# Patient Record
Sex: MALE | Race: WHITE | NOT HISPANIC OR LATINO | URBAN - METROPOLITAN AREA
[De-identification: names, ages, dates, MRNs, and addresses within clinical notes are randomized per-mention and may not be internally consistent; named-entity substitution may affect disease eponyms.]

---

## 2019-08-01 ENCOUNTER — EMERGENCY (EMERGENCY)
Facility: HOSPITAL | Age: 54
LOS: 0 days | Discharge: HOME | End: 2019-08-01
Attending: EMERGENCY MEDICINE | Admitting: EMERGENCY MEDICINE
Payer: MEDICAID

## 2019-08-01 VITALS
SYSTOLIC BLOOD PRESSURE: 170 MMHG | RESPIRATION RATE: 19 BRPM | DIASTOLIC BLOOD PRESSURE: 87 MMHG | OXYGEN SATURATION: 100 % | TEMPERATURE: 98 F | WEIGHT: 149.91 LBS | HEART RATE: 60 BPM

## 2019-08-01 DIAGNOSIS — R39.11 HESITANCY OF MICTURITION: ICD-10-CM

## 2019-08-01 DIAGNOSIS — R30.0 DYSURIA: ICD-10-CM

## 2019-08-01 LAB
ALBUMIN SERPL ELPH-MCNC: 3.9 G/DL — SIGNIFICANT CHANGE UP (ref 3.5–5.2)
ALP SERPL-CCNC: 60 U/L — SIGNIFICANT CHANGE UP (ref 30–115)
ALT FLD-CCNC: 18 U/L — SIGNIFICANT CHANGE UP (ref 0–41)
ANION GAP SERPL CALC-SCNC: 10 MMOL/L — SIGNIFICANT CHANGE UP (ref 7–14)
APPEARANCE UR: CLEAR — SIGNIFICANT CHANGE UP
AST SERPL-CCNC: 22 U/L — SIGNIFICANT CHANGE UP (ref 0–41)
BASOPHILS # BLD AUTO: 0.03 K/UL — SIGNIFICANT CHANGE UP (ref 0–0.2)
BASOPHILS NFR BLD AUTO: 0.7 % — SIGNIFICANT CHANGE UP (ref 0–1)
BILIRUB SERPL-MCNC: 0.6 MG/DL — SIGNIFICANT CHANGE UP (ref 0.2–1.2)
BILIRUB UR-MCNC: NEGATIVE — SIGNIFICANT CHANGE UP
BUN SERPL-MCNC: 23 MG/DL — HIGH (ref 10–20)
CALCIUM SERPL-MCNC: 9.3 MG/DL — SIGNIFICANT CHANGE UP (ref 8.5–10.1)
CHLORIDE SERPL-SCNC: 98 MMOL/L — SIGNIFICANT CHANGE UP (ref 98–110)
CO2 SERPL-SCNC: 28 MMOL/L — SIGNIFICANT CHANGE UP (ref 17–32)
COLOR SPEC: COLORLESS — SIGNIFICANT CHANGE UP
CREAT SERPL-MCNC: 1 MG/DL — SIGNIFICANT CHANGE UP (ref 0.7–1.5)
DIFF PNL FLD: SIGNIFICANT CHANGE UP
EOSINOPHIL # BLD AUTO: 0.11 K/UL — SIGNIFICANT CHANGE UP (ref 0–0.7)
EOSINOPHIL NFR BLD AUTO: 2.7 % — SIGNIFICANT CHANGE UP (ref 0–8)
GLUCOSE SERPL-MCNC: 98 MG/DL — SIGNIFICANT CHANGE UP (ref 70–99)
GLUCOSE UR QL: NEGATIVE — SIGNIFICANT CHANGE UP
HCT VFR BLD CALC: 39.5 % — LOW (ref 42–52)
HGB BLD-MCNC: 13 G/DL — LOW (ref 14–18)
IMM GRANULOCYTES NFR BLD AUTO: 0.2 % — SIGNIFICANT CHANGE UP (ref 0.1–0.3)
KETONES UR-MCNC: NEGATIVE — SIGNIFICANT CHANGE UP
LEUKOCYTE ESTERASE UR-ACNC: NEGATIVE — SIGNIFICANT CHANGE UP
LYMPHOCYTES # BLD AUTO: 1.68 K/UL — SIGNIFICANT CHANGE UP (ref 1.2–3.4)
LYMPHOCYTES # BLD AUTO: 41.3 % — SIGNIFICANT CHANGE UP (ref 20.5–51.1)
MCHC RBC-ENTMCNC: 28.4 PG — SIGNIFICANT CHANGE UP (ref 27–31)
MCHC RBC-ENTMCNC: 32.9 G/DL — SIGNIFICANT CHANGE UP (ref 32–37)
MCV RBC AUTO: 86.4 FL — SIGNIFICANT CHANGE UP (ref 80–94)
MONOCYTES # BLD AUTO: 0.44 K/UL — SIGNIFICANT CHANGE UP (ref 0.1–0.6)
MONOCYTES NFR BLD AUTO: 10.8 % — HIGH (ref 1.7–9.3)
NEUTROPHILS # BLD AUTO: 1.8 K/UL — SIGNIFICANT CHANGE UP (ref 1.4–6.5)
NEUTROPHILS NFR BLD AUTO: 44.3 % — SIGNIFICANT CHANGE UP (ref 42.2–75.2)
NITRITE UR-MCNC: NEGATIVE — SIGNIFICANT CHANGE UP
NRBC # BLD: 0 /100 WBCS — SIGNIFICANT CHANGE UP (ref 0–0)
PH UR: 7 — SIGNIFICANT CHANGE UP (ref 5–8)
PLATELET # BLD AUTO: 172 K/UL — SIGNIFICANT CHANGE UP (ref 130–400)
POTASSIUM SERPL-MCNC: 4.1 MMOL/L — SIGNIFICANT CHANGE UP (ref 3.5–5)
POTASSIUM SERPL-SCNC: 4.1 MMOL/L — SIGNIFICANT CHANGE UP (ref 3.5–5)
PROT SERPL-MCNC: 6.7 G/DL — SIGNIFICANT CHANGE UP (ref 6–8)
PROT UR-MCNC: NEGATIVE — SIGNIFICANT CHANGE UP
RBC # BLD: 4.57 M/UL — LOW (ref 4.7–6.1)
RBC # FLD: 12.7 % — SIGNIFICANT CHANGE UP (ref 11.5–14.5)
SODIUM SERPL-SCNC: 136 MMOL/L — SIGNIFICANT CHANGE UP (ref 135–146)
SP GR SPEC: 1.01 — LOW (ref 1.01–1.02)
UROBILINOGEN FLD QL: SIGNIFICANT CHANGE UP
WBC # BLD: 4.07 K/UL — LOW (ref 4.8–10.8)
WBC # FLD AUTO: 4.07 K/UL — LOW (ref 4.8–10.8)

## 2019-08-01 PROCEDURE — 99284 EMERGENCY DEPT VISIT MOD MDM: CPT

## 2019-08-01 NOTE — ED PROVIDER NOTE - NSFOLLOWUPCLINICS_GEN_ALL_ED_FT
[facility]  Medicine  [address], [place]   Phone: [phone]  Fax:     [facility]  Urology  .  [place]   Phone: [phone]  Fax:   Follow Up Time:

## 2019-08-01 NOTE — ED ADULT NURSE NOTE - CHIEF COMPLAINT QUOTE
Pt came c/o lower back pain and frequency with burning on urination x 1 year, pt just came from [facility] 2 weeks ago.

## 2019-08-01 NOTE — ED PROVIDER NOTE - CLINICAL SUMMARY MEDICAL DECISION MAKING FREE TEXT BOX
intermitt urinary hesitancy & bl lbp - neuro exam nl - ED w/u incl labs, urine wnl - all results d/w pt & copies given, strict return precautions discussed, rec outpt PCP/Uro f/u, referrals given

## 2019-08-01 NOTE — ED ADULT TRIAGE NOTE - CHIEF COMPLAINT QUOTE
Pt came c/o lower back pain and frequency with burning on urination x 1 year, pt just came from [facility] 2 weeks ago.

## 2019-08-01 NOTE — ED PROVIDER NOTE - NS ED ROS FT
Constitutional: No fever, chills, unintended weight loss.  Eyes:  No visual changes, eye pain or discharge.  ENMT:  No hearing changes, pain, no sore throat or runny nose, no difficulty swallowing  Cardiac:  No chest pain, SOB or edema. No chest pain with exertion.  Respiratory:  No cough or respiratory distress. No hemoptysis. No history of asthma or RAD.  GI:  No nausea, vomiting, diarrhea or abdominal pain.  :  see hpi  MS:  No myalgia, muscle weakness, joint pain; intermitt bl lbp  Neuro:  No headache or weakness.  No LOC.  Skin:  No skin rash.   Endocrine: No history of thyroid disease or diabetes.

## 2019-08-01 NOTE — ED ADULT NURSE NOTE - OBJECTIVE STATEMENT
Pt reports bilateral flank pain and frequent urination. Pt denies nausea, vomiting, diarrhea, and fever.

## 2019-08-01 NOTE — ED PROVIDER NOTE - PHYSICAL EXAMINATION
VITAL SIGNS: I have reviewed nursing notes and confirm.  CONSTITUTIONAL: Well-developed; well-nourished; in no acute distress.  SKIN: Skin exam is warm and dry, no acute rash.  HEAD: Normocephalic; atraumatic.  EYES: PERRL, EOM intact; conjunctiva and sclera clear.  ENT: No nasal discharge; airway clear.  NECK: Supple; non tender.  CARD: S1, S2 normal; no murmurs, gallops, or rubs. Regular rate and rhythm.  RESP: No wheezes, rales or rhonchi.  ABD: Normal bowel sounds; soft; non-distended; non-tender; no hepatosplenomegaly.  BACK: non-tender, no CVAT  EXT: Normal ROM. No clubbing, cyanosis or edema. DPI.  NEURO: Alert, oriented. Grossly unremarkable. No focal deficits. 5/5 strength x 4, gross sensation intact, DTRs intact.  PSYCH: Cooperative, appropriate.

## 2019-08-01 NOTE — ED PROVIDER NOTE - NSFOLLOWUPINSTRUCTIONS_ED_ALL_ED_FT
Dysuria    Dysuria is pain or discomfort while urinating. The pain or discomfort may be felt in the tube that carries urine out of the bladder (urethra) or in the surrounding tissue of the genitals. The pain may also be felt in the groin area, lower abdomen, and lower back. You may have to urinate frequently or have the sudden feeling that you have to urinate (urgency). Dysuria can affect both men and women, but is more common in women.    Dysuria can be caused by many different things, including:    Urinary tract infection in women.  Infection of the kidney or bladder.  Kidney stones or bladder stones.  Certain sexually transmitted infections (STIs), such as chlamydia.  Dehydration.  Inflammation of the vagina.  Use of certain medicines.  Use of certain soaps or scented products that cause irritation.    HOME CARE INSTRUCTIONS  Watch your dysuria for any changes. The following actions may help to reduce any discomfort you are feeling:    Drink enough fluid to keep your urine clear or pale yellow.  Empty your bladder often. Avoid holding urine for long periods of time.  After a bowel movement or urination, women should cleanse from front to back, using each tissue only once.  Empty your bladder after sexual intercourse.  Take medicines only as directed by your health care provider.  If you were prescribed an antibiotic medicine, finish it all even if you start to feel better.  Avoid caffeine, tea, and alcohol. They can irritate the bladder and make dysuria worse. In men, alcohol may irritate the prostate.  Keep all follow-up visits as directed by your health care provider. This is important.  If you had any tests done to find the cause of dysuria, it is your responsibility to obtain your test results. Ask the lab or department performing the test when and how you will get your results. Talk with your health care provider if you have any questions about your results.    SEEK MEDICAL CARE IF:  You develop pain in your back or sides.  You have a fever.  You have nausea or vomiting.  You have blood in your urine.  You are not urinating as often as you usually do.    SEEK IMMEDIATE MEDICAL CARE IF:  You pain is severe and not relieved with medicines.  You are unable to hold down any fluids.  You or someone else notices a change in your mental function.  You have a rapid heartbeat at rest.  You have shaking or chills.  You feel extremely weak.    ADDITIONAL NOTES AND INSTRUCTIONS    Please follow up with your Primary MD in 24-48 hr.  Seek immediate medical care for any new/worsening signs or symptoms.

## 2019-08-01 NOTE — ED PROVIDER NOTE - OBJECTIVE STATEMENT
53y m no pmh p/w intermitt urinary hesitency & bl lbp x 1 yr. Recently emig[place] from [facility] 3 wks ago for work. DId not seek medical care in [facility]. Denies f/c, cp/sob, nvd, abd pain, saddle anesthesia, focal numbness or weakness, dysuria, hematuria.

## 2019-08-02 LAB
CULTURE RESULTS: NO GROWTH — SIGNIFICANT CHANGE UP
SPECIMEN SOURCE: SIGNIFICANT CHANGE UP

## 2019-09-13 ENCOUNTER — EMERGENCY (EMERGENCY)
Facility: HOSPITAL | Age: 54
LOS: 0 days | Discharge: HOME | End: 2019-09-13
Attending: EMERGENCY MEDICINE | Admitting: EMERGENCY MEDICINE
Payer: MEDICAID

## 2019-09-13 VITALS
OXYGEN SATURATION: 99 % | SYSTOLIC BLOOD PRESSURE: 209 MMHG | RESPIRATION RATE: 18 BRPM | HEART RATE: 75 BPM | DIASTOLIC BLOOD PRESSURE: 103 MMHG | TEMPERATURE: 98 F

## 2019-09-13 VITALS
SYSTOLIC BLOOD PRESSURE: 178 MMHG | RESPIRATION RATE: 17 BRPM | DIASTOLIC BLOOD PRESSURE: 109 MMHG | OXYGEN SATURATION: 100 % | HEART RATE: 76 BPM

## 2019-09-13 DIAGNOSIS — R10.9 UNSPECIFIED ABDOMINAL PAIN: ICD-10-CM

## 2019-09-13 DIAGNOSIS — I16.0 HYPERTENSIVE URGENCY: ICD-10-CM

## 2019-09-13 DIAGNOSIS — G89.29 OTHER CHRONIC PAIN: ICD-10-CM

## 2019-09-13 DIAGNOSIS — M54.6 PAIN IN THORACIC SPINE: ICD-10-CM

## 2019-09-13 DIAGNOSIS — R35.0 FREQUENCY OF MICTURITION: ICD-10-CM

## 2019-09-13 DIAGNOSIS — R30.0 DYSURIA: ICD-10-CM

## 2019-09-13 DIAGNOSIS — R51 HEADACHE: ICD-10-CM

## 2019-09-13 PROBLEM — I10 ESSENTIAL (PRIMARY) HYPERTENSION: Chronic | Status: ACTIVE | Noted: 2019-08-01

## 2019-09-13 PROCEDURE — 99283 EMERGENCY DEPT VISIT LOW MDM: CPT

## 2019-09-13 RX ORDER — LISINOPRIL 2.5 MG/1
10 TABLET ORAL DAILY
Refills: 0 | Status: DISCONTINUED | OUTPATIENT
Start: 2019-09-13 | End: 2019-09-13

## 2019-09-13 RX ORDER — AMLODIPINE BESYLATE 2.5 MG/1
5 TABLET ORAL ONCE
Refills: 0 | Status: COMPLETED | OUTPATIENT
Start: 2019-09-13 | End: 2019-09-13

## 2019-09-13 RX ORDER — AMLODIPINE BESYLATE 2.5 MG/1
1 TABLET ORAL
Qty: 30 | Refills: 0
Start: 2019-09-13 | End: 2019-10-12

## 2019-09-13 RX ORDER — LISINOPRIL 2.5 MG/1
1 TABLET ORAL
Qty: 30 | Refills: 0
Start: 2019-09-13 | End: 2019-10-12

## 2019-09-13 RX ADMIN — LISINOPRIL 10 MILLIGRAM(S): 2.5 TABLET ORAL at 09:46

## 2019-09-13 RX ADMIN — AMLODIPINE BESYLATE 5 MILLIGRAM(S): 2.5 TABLET ORAL at 09:46

## 2019-09-13 NOTE — ED PROVIDER NOTE - NS ED ROS FT
CONSTITUTIONAL: no fever, no chills  EYES: no eye pain, no visual changes  ENT: no ear pain, no hearing changes  CARD: no chest pain, no palpitations  RESP: no cough, no SOB  GI: no nausea, no vomiting, no diarrhea. +chronic abd pain  : +dysuria, +frequency  MSK: +chronic upper back pain, no neck pain  SKIN: no skin rashes, no pruritus  NEURO: +mild headache, no dizziness, no weakness or numbness

## 2019-09-13 NOTE — ED ADULT NURSE NOTE - OBJECTIVE STATEMENT
pt came to the ER today with c/o high blood pressure and urinary frequency. pt states the medications from his country are not working and he ran out of prescription he was given in the [place]. denies headache denies chest pain.

## 2019-09-13 NOTE — ED PROVIDER NOTE - PROGRESS NOTE DETAILS
Patient does not have flank pain, but wants refill of his hypertensive medication. No w/u for given symptoms. Will d/c with f/u to medicine clinic at [address] [place] [address]. Rapid clinic f/u appointment on 9/17/19 at 12:45 PM. Will d/c with lisinopril and amlodipine for 1 month. Patient agrees with plan.

## 2019-09-13 NOTE — ED PROVIDER NOTE - PHYSICAL EXAMINATION
GENERAL: well-appearing male, in NAD lying on stretcher  HEAD: NCAT  EYES: no scleral icterus, no conjunctival injection  ENT: moist mucous membranes, nl appearing pharynx  NECK: supple, no masses  CARD: nl s1 s2, RRR, no m/r/g  RESP: nl respiratory effort, CTAB  ABD: soft, non-tender, non-distended  BACK: no CVA tenderness b/l  EXT: FROMx4, no pedal edema, distal pulses intact  SKIN: warm, dry  NEURO: A&Ox3, answers questions appropriately, CN II-XII intact, sensation intact to gross/light touch, 5/5 strength in b/l UE and LE

## 2019-09-13 NOTE — ED PROVIDER NOTE - CLINICAL SUMMARY MEDICAL DECISION MAKING FREE TEXT BOX
Feels better after meds.  Given rapid clinic follow up as well as short course of medication refills.

## 2019-09-13 NOTE — ED ADULT NURSE NOTE - NSIMPLEMENTINTERV_GEN_ALL_ED
Implemented All Universal Safety Interventions:  [place] to call system. Call bell, personal items and telephone within reach. Instruct patient to call for assistance. Room bathroom lighting operational. Non-slip footwear when patient is off stretcher. Physically safe environment: no spills, clutter or unnecessary equipment. Stretcher in lowest position, wheels locked, appropriate side rails in place.

## 2019-09-13 NOTE — ED PROVIDER NOTE - ATTENDING CONTRIBUTION TO CARE
Attending Statement: I have personally seen and examined this patient. I have fully participated in the care of this patient. I have reviewed all pertinent clinical information, including history, physical exam, plan and the Medical Student's note and agree except as noted.    56 y/o male with hx of HTN, non compliant with meds as has not refilled them, presents to ED with HA and request med refill.  No CP/SOB.  No numbness, tingling or weakness.  No dysarthria or dysphagia. PE:  agree with above.  A/P:  Uncontrolled HTN.  Meds and reassess.

## 2019-09-13 NOTE — ED PROVIDER NOTE - PATIENT PORTAL LINK FT
You can access the FollowMyHealth Patient Portal offered by [facility] by registering at the following website: [email or web address]. By joining [facility]’s FollowMyHealth portal, you will also be able to view your health information using other applications (apps) compatible with our system.

## 2019-09-13 NOTE — ED PROVIDER NOTE - NSFOLLOWUPCLINICS_GEN_ALL_ED_FT
[facility]  Medicine  [address], [place]   Phone: [phone]  Fax:   Follow Up Time:

## 2019-09-17 ENCOUNTER — OUTPATIENT (OUTPATIENT)
Dept: OUTPATIENT SERVICES | Facility: HOSPITAL | Age: 54
LOS: 1 days | Discharge: HOME | End: 2019-09-17

## 2019-09-17 ENCOUNTER — APPOINTMENT (OUTPATIENT)
Dept: INTERNAL MEDICINE | Facility: CLINIC | Age: 54
End: 2019-09-17

## 2019-09-17 VITALS
HEIGHT: 70 IN | DIASTOLIC BLOOD PRESSURE: 104 MMHG | BODY MASS INDEX: 25.77 KG/M2 | HEART RATE: 79 BPM | TEMPERATURE: 99 F | SYSTOLIC BLOOD PRESSURE: 156 MMHG | WEIGHT: 180 LBS

## 2019-09-17 DIAGNOSIS — Z87.891 PERSONAL HISTORY OF NICOTINE DEPENDENCE: ICD-10-CM

## 2019-09-17 DIAGNOSIS — R30.0 DYSURIA: ICD-10-CM

## 2019-09-17 DIAGNOSIS — Z87.442 PERSONAL HISTORY OF URINARY CALCULI: ICD-10-CM

## 2019-09-17 DIAGNOSIS — Z80.8 FAMILY HISTORY OF MALIGNANT NEOPLASM OF OTHER ORGANS OR SYSTEMS: ICD-10-CM

## 2019-09-17 DIAGNOSIS — Z82.49 FAMILY HISTORY OF ISCHEMIC HEART DISEASE AND OTHER DISEASES OF THE CIRCULATORY SYSTEM: ICD-10-CM

## 2019-09-17 DIAGNOSIS — N40.0 BENIGN PROSTATIC HYPERPLASIA WITHOUT LOWER URINARY TRACT SYMPMS: ICD-10-CM

## 2019-09-17 DIAGNOSIS — I10 ESSENTIAL (PRIMARY) HYPERTENSION: ICD-10-CM

## 2019-09-17 DIAGNOSIS — K52.9 NONINFECTIVE GASTROENTERITIS AND COLITIS, UNSPECIFIED: ICD-10-CM

## 2019-09-17 DIAGNOSIS — G89.29 DORSALGIA, UNSPECIFIED: ICD-10-CM

## 2019-09-17 DIAGNOSIS — Z00.00 ENCOUNTER FOR GENERAL ADULT MEDICAL EXAMINATION W/OUT ABNORMAL FINDINGS: ICD-10-CM

## 2019-09-17 DIAGNOSIS — Z87.440 PERSONAL HISTORY OF URINARY (TRACT) INFECTIONS: ICD-10-CM

## 2019-09-17 DIAGNOSIS — M54.9 DORSALGIA, UNSPECIFIED: ICD-10-CM

## 2019-09-17 DIAGNOSIS — R39.12 POOR URINARY STREAM: ICD-10-CM

## 2019-09-17 RX ORDER — TAMSULOSIN HYDROCHLORIDE 0.4 MG/1
0.4 CAPSULE ORAL
Qty: 30 | Refills: 3 | Status: ACTIVE | COMMUNITY
Start: 2019-09-17 | End: 1900-01-01

## 2019-09-17 RX ORDER — LISINOPRIL 10 MG/1
10 TABLET ORAL DAILY
Qty: 30 | Refills: 3 | Status: ACTIVE | COMMUNITY
Start: 2019-09-17 | End: 1900-01-01

## 2019-09-17 RX ORDER — AMLODIPINE BESYLATE 5 MG/1
5 TABLET ORAL DAILY
Qty: 30 | Refills: 5 | Status: ACTIVE | COMMUNITY
Start: 2019-09-17 | End: 1900-01-01

## 2019-09-17 NOTE — PHYSICAL EXAM
[No Acute Distress] : no acute distress [Well Developed] : well developed [Well Nourished] : well nourished [PERRL] : pupils equal round and reactive to light [Well-Appearing] : well-appearing [Normal Sclera/Conjunctiva] : normal sclera/conjunctiva [Normal Outer Ear/Nose] : the outer ears and nose were normal in appearance [EOMI] : extraocular movements intact [Normal Oropharynx] : the oropharynx was normal [No Lymphadenopathy] : no lymphadenopathy [No JVD] : no jugular venous distention [Supple] : supple [Thyroid Normal, No Nodules] : the thyroid was normal and there were no nodules present [No Respiratory Distress] : no respiratory distress  [No Accessory Muscle Use] : no accessory muscle use [Normal Rate] : normal rate  [Clear to Auscultation] : lungs were clear to auscultation bilaterally [Normal S1, S2] : normal S1 and S2 [Regular Rhythm] : with a regular rhythm [No Murmur] : no murmur heard [No Carotid Bruits] : no carotid bruits [No Varicosities] : no varicosities [No Abdominal Bruit] : a ~M bruit was not heard ~T in the abdomen [Pedal Pulses Present] : the pedal pulses are present [No Palpable Aorta] : no palpable aorta [No Edema] : there was no peripheral edema [Soft] : abdomen soft [No Extremity Clubbing/Cyanosis] : no extremity clubbing/cyanosis [Non Tender] : non-tender [Non-distended] : non-distended [No Masses] : no abdominal mass palpated [No HSM] : no HSM [Normal Bowel Sounds] : normal bowel sounds [Normal Posterior Cervical Nodes] : no posterior cervical lymphadenopathy [Normal Anterior Cervical Nodes] : no anterior cervical lymphadenopathy [No CVA Tenderness] : no CVA  tenderness [No Spinal Tenderness] : no spinal tenderness [Grossly Normal Strength/Tone] : grossly normal strength/tone [No Joint Swelling] : no joint swelling [No Rash] : no rash [Coordination Grossly Intact] : coordination grossly intact [No Focal Deficits] : no focal deficits [Normal Gait] : normal gait [Deep Tendon Reflexes (DTR)] : deep tendon reflexes were 2+ and symmetric [Normal Affect] : the affect was normal [Normal Insight/Judgement] : insight and judgment were intact

## 2019-09-19 ENCOUNTER — FORM ENCOUNTER (OUTPATIENT)
Age: 54
End: 2019-09-19

## 2019-09-20 ENCOUNTER — OUTPATIENT (OUTPATIENT)
Dept: OUTPATIENT SERVICES | Facility: HOSPITAL | Age: 54
LOS: 1 days | Discharge: HOME | End: 2019-09-20
Payer: MEDICAID

## 2019-09-20 DIAGNOSIS — R30.0 DYSURIA: ICD-10-CM

## 2019-09-20 LAB
ALBUMIN SERPL ELPH-MCNC: 4.8 G/DL
ALP BLD-CCNC: 63 U/L
ALT SERPL-CCNC: 17 U/L
ANION GAP SERPL CALC-SCNC: 12 MMOL/L
APPEARANCE: CLEAR
AST SERPL-CCNC: 18 U/L
BASOPHILS # BLD AUTO: 0.02 K/UL
BASOPHILS NFR BLD AUTO: 0.5 %
BILIRUB SERPL-MCNC: 0.5 MG/DL
BILIRUBIN URINE: NEGATIVE
BLOOD URINE: NEGATIVE
BUN SERPL-MCNC: 13 MG/DL
CALCIUM SERPL-MCNC: 9.8 MG/DL
CHLORIDE SERPL-SCNC: 100 MMOL/L
CHOLEST SERPL-MCNC: 208 MG/DL
CHOLEST/HDLC SERPL: 4.6 RATIO
CO2 SERPL-SCNC: 32 MMOL/L
COLOR: YELLOW
CREAT SERPL-MCNC: 1 MG/DL
EOSINOPHIL # BLD AUTO: 0.06 K/UL
EOSINOPHIL NFR BLD AUTO: 1.5 %
ESTIMATED AVERAGE GLUCOSE: 114 MG/DL
GLUCOSE QUALITATIVE U: NEGATIVE
GLUCOSE SERPL-MCNC: 92 MG/DL
HBA1C MFR BLD HPLC: 5.6 %
HCT VFR BLD CALC: 43.8 %
HDLC SERPL-MCNC: 45 MG/DL
HGB BLD-MCNC: 14.6 G/DL
IMM GRANULOCYTES NFR BLD AUTO: 0 %
KETONES URINE: NEGATIVE
LDLC SERPL CALC-MCNC: 142 MG/DL
LEUKOCYTE ESTERASE URINE: NEGATIVE
LYMPHOCYTES # BLD AUTO: 1.97 K/UL
LYMPHOCYTES NFR BLD AUTO: 50.5 %
MAN DIFF?: NORMAL
MCHC RBC-ENTMCNC: 28.5 PG
MCHC RBC-ENTMCNC: 33.3 G/DL
MCV RBC AUTO: 85.4 FL
MONOCYTES # BLD AUTO: 0.27 K/UL
MONOCYTES NFR BLD AUTO: 6.9 %
NEUTROPHILS # BLD AUTO: 1.58 K/UL
NEUTROPHILS NFR BLD AUTO: 40.6 %
NITRITE URINE: NEGATIVE
PH URINE: 6.5
PLATELET # BLD AUTO: 239 K/UL
POTASSIUM SERPL-SCNC: 5 MMOL/L
PROT SERPL-MCNC: 7.6 G/DL
PROTEIN URINE: NORMAL
PSA FREE FLD-MCNC: 13 %
PSA FREE SERPL-MCNC: 0.15 NG/ML
PSA SERPL-MCNC: 1.22 NG/ML
RBC # BLD: 5.13 M/UL
RBC # FLD: 12.8 %
SODIUM SERPL-SCNC: 144 MMOL/L
SPECIFIC GRAVITY URINE: 1.02
TRIGL SERPL-MCNC: 151 MG/DL
TSH SERPL-ACNC: 2.17 UIU/ML
UROBILINOGEN URINE: NORMAL
WBC # FLD AUTO: 3.9 K/UL

## 2019-09-20 PROCEDURE — 76856 US EXAM PELVIC COMPLETE: CPT | Mod: 26

## 2019-10-24 ENCOUNTER — EMERGENCY (EMERGENCY)
Facility: HOSPITAL | Age: 54
LOS: 0 days | Discharge: LEFT BEFORE DISCHARGE | End: 2019-10-24
Attending: EMERGENCY MEDICINE | Admitting: EMERGENCY MEDICINE
Payer: MEDICAID

## 2019-10-24 VITALS
RESPIRATION RATE: 18 BRPM | DIASTOLIC BLOOD PRESSURE: 95 MMHG | OXYGEN SATURATION: 100 % | SYSTOLIC BLOOD PRESSURE: 141 MMHG | HEART RATE: 73 BPM | TEMPERATURE: 98 F

## 2019-10-24 VITALS
OXYGEN SATURATION: 98 % | TEMPERATURE: 97 F | HEIGHT: 69 IN | WEIGHT: 160.06 LBS | HEART RATE: 76 BPM | RESPIRATION RATE: 19 BRPM | SYSTOLIC BLOOD PRESSURE: 163 MMHG | DIASTOLIC BLOOD PRESSURE: 88 MMHG

## 2019-10-24 DIAGNOSIS — R07.9 CHEST PAIN, UNSPECIFIED: ICD-10-CM

## 2019-10-24 DIAGNOSIS — Z98.890 OTHER SPECIFIED POSTPROCEDURAL STATES: Chronic | ICD-10-CM

## 2019-10-24 DIAGNOSIS — R07.89 OTHER CHEST PAIN: ICD-10-CM

## 2019-10-24 DIAGNOSIS — R06.02 SHORTNESS OF BREATH: ICD-10-CM

## 2019-10-24 DIAGNOSIS — Z87.891 PERSONAL HISTORY OF NICOTINE DEPENDENCE: ICD-10-CM

## 2019-10-24 DIAGNOSIS — I10 ESSENTIAL (PRIMARY) HYPERTENSION: ICD-10-CM

## 2019-10-24 DIAGNOSIS — R00.2 PALPITATIONS: ICD-10-CM

## 2019-10-24 LAB
ALBUMIN SERPL ELPH-MCNC: 4.3 G/DL — SIGNIFICANT CHANGE UP (ref 3.5–5.2)
ALP SERPL-CCNC: 51 U/L — SIGNIFICANT CHANGE UP (ref 30–115)
ALT FLD-CCNC: 14 U/L — SIGNIFICANT CHANGE UP (ref 0–41)
ANION GAP SERPL CALC-SCNC: 14 MMOL/L — SIGNIFICANT CHANGE UP (ref 7–14)
APPEARANCE UR: CLEAR — SIGNIFICANT CHANGE UP
APTT BLD: 31.5 SEC — SIGNIFICANT CHANGE UP (ref 27–39.2)
AST SERPL-CCNC: 18 U/L — SIGNIFICANT CHANGE UP (ref 0–41)
BASOPHILS # BLD AUTO: 0.02 K/UL — SIGNIFICANT CHANGE UP (ref 0–0.2)
BASOPHILS NFR BLD AUTO: 0.7 % — SIGNIFICANT CHANGE UP (ref 0–1)
BILIRUB SERPL-MCNC: 0.7 MG/DL — SIGNIFICANT CHANGE UP (ref 0.2–1.2)
BILIRUB UR-MCNC: NEGATIVE — SIGNIFICANT CHANGE UP
BUN SERPL-MCNC: 13 MG/DL — SIGNIFICANT CHANGE UP (ref 10–20)
CALCIUM SERPL-MCNC: 9.4 MG/DL — SIGNIFICANT CHANGE UP (ref 8.5–10.1)
CHLORIDE SERPL-SCNC: 99 MMOL/L — SIGNIFICANT CHANGE UP (ref 98–110)
CO2 SERPL-SCNC: 25 MMOL/L — SIGNIFICANT CHANGE UP (ref 17–32)
COLOR SPEC: YELLOW — SIGNIFICANT CHANGE UP
CREAT SERPL-MCNC: 0.9 MG/DL — SIGNIFICANT CHANGE UP (ref 0.7–1.5)
D DIMER BLD IA.RAPID-MCNC: 108 NG/ML DDU — SIGNIFICANT CHANGE UP (ref 0–230)
DIFF PNL FLD: NEGATIVE — SIGNIFICANT CHANGE UP
EOSINOPHIL # BLD AUTO: 0.09 K/UL — SIGNIFICANT CHANGE UP (ref 0–0.7)
EOSINOPHIL NFR BLD AUTO: 3.2 % — SIGNIFICANT CHANGE UP (ref 0–8)
GLUCOSE SERPL-MCNC: 96 MG/DL — SIGNIFICANT CHANGE UP (ref 70–99)
GLUCOSE UR QL: NEGATIVE — SIGNIFICANT CHANGE UP
HCT VFR BLD CALC: 42.4 % — SIGNIFICANT CHANGE UP (ref 42–52)
HGB BLD-MCNC: 14.2 G/DL — SIGNIFICANT CHANGE UP (ref 14–18)
IMM GRANULOCYTES NFR BLD AUTO: 0 % — LOW (ref 0.1–0.3)
INR BLD: 1.09 RATIO — SIGNIFICANT CHANGE UP (ref 0.65–1.3)
KETONES UR-MCNC: NEGATIVE — SIGNIFICANT CHANGE UP
LEUKOCYTE ESTERASE UR-ACNC: NEGATIVE — SIGNIFICANT CHANGE UP
LYMPHOCYTES # BLD AUTO: 1.39 K/UL — SIGNIFICANT CHANGE UP (ref 1.2–3.4)
LYMPHOCYTES # BLD AUTO: 49.6 % — SIGNIFICANT CHANGE UP (ref 20.5–51.1)
MCHC RBC-ENTMCNC: 28.7 PG — SIGNIFICANT CHANGE UP (ref 27–31)
MCHC RBC-ENTMCNC: 33.5 G/DL — SIGNIFICANT CHANGE UP (ref 32–37)
MCV RBC AUTO: 85.8 FL — SIGNIFICANT CHANGE UP (ref 80–94)
MONOCYTES # BLD AUTO: 0.22 K/UL — SIGNIFICANT CHANGE UP (ref 0.1–0.6)
MONOCYTES NFR BLD AUTO: 7.9 % — SIGNIFICANT CHANGE UP (ref 1.7–9.3)
NEUTROPHILS # BLD AUTO: 1.08 K/UL — LOW (ref 1.4–6.5)
NEUTROPHILS NFR BLD AUTO: 38.6 % — LOW (ref 42.2–75.2)
NITRITE UR-MCNC: NEGATIVE — SIGNIFICANT CHANGE UP
NRBC # BLD: 0 /100 WBCS — SIGNIFICANT CHANGE UP (ref 0–0)
NT-PROBNP SERPL-SCNC: 96 PG/ML — SIGNIFICANT CHANGE UP (ref 0–300)
PH UR: 7 — SIGNIFICANT CHANGE UP (ref 5–8)
PLATELET # BLD AUTO: 216 K/UL — SIGNIFICANT CHANGE UP (ref 130–400)
POTASSIUM SERPL-MCNC: 4.5 MMOL/L — SIGNIFICANT CHANGE UP (ref 3.5–5)
POTASSIUM SERPL-SCNC: 4.5 MMOL/L — SIGNIFICANT CHANGE UP (ref 3.5–5)
PROT SERPL-MCNC: 7.2 G/DL — SIGNIFICANT CHANGE UP (ref 6–8)
PROT UR-MCNC: SIGNIFICANT CHANGE UP
PROTHROM AB SERPL-ACNC: 12.5 SEC — SIGNIFICANT CHANGE UP (ref 9.95–12.87)
RBC # BLD: 4.94 M/UL — SIGNIFICANT CHANGE UP (ref 4.7–6.1)
RBC # FLD: 12.6 % — SIGNIFICANT CHANGE UP (ref 11.5–14.5)
SODIUM SERPL-SCNC: 138 MMOL/L — SIGNIFICANT CHANGE UP (ref 135–146)
SP GR SPEC: 1.02 — SIGNIFICANT CHANGE UP (ref 1.01–1.02)
TROPONIN T SERPL-MCNC: <0.01 NG/ML — SIGNIFICANT CHANGE UP
TROPONIN T SERPL-MCNC: <0.01 NG/ML — SIGNIFICANT CHANGE UP
UROBILINOGEN FLD QL: SIGNIFICANT CHANGE UP
WBC # BLD: 2.8 K/UL — LOW (ref 4.8–10.8)
WBC # FLD AUTO: 2.8 K/UL — LOW (ref 4.8–10.8)

## 2019-10-24 PROCEDURE — 75571 CT HRT W/O DYE W/CA TEST: CPT | Mod: 26

## 2019-10-24 PROCEDURE — 93016 CV STRESS TEST SUPVJ ONLY: CPT

## 2019-10-24 PROCEDURE — 99234 HOSP IP/OBS SM DT SF/LOW 45: CPT

## 2019-10-24 PROCEDURE — 71046 X-RAY EXAM CHEST 2 VIEWS: CPT | Mod: 26

## 2019-10-24 PROCEDURE — 93010 ELECTROCARDIOGRAM REPORT: CPT

## 2019-10-24 PROCEDURE — 99284 EMERGENCY DEPT VISIT MOD MDM: CPT

## 2019-10-24 PROCEDURE — 93018 CV STRESS TEST I&R ONLY: CPT

## 2019-10-24 RX ORDER — METOPROLOL TARTRATE 50 MG
50 TABLET ORAL ONCE
Refills: 0 | Status: COMPLETED | OUTPATIENT
Start: 2019-10-24 | End: 2019-10-24

## 2019-10-24 RX ORDER — ASPIRIN/CALCIUM CARB/MAGNESIUM 324 MG
325 TABLET ORAL ONCE
Refills: 0 | Status: COMPLETED | OUTPATIENT
Start: 2019-10-24 | End: 2019-10-24

## 2019-10-24 RX ADMIN — Medication 50 MILLIGRAM(S): at 11:41

## 2019-10-24 RX ADMIN — Medication 325 MILLIGRAM(S): at 11:41

## 2019-10-24 NOTE — ED ADULT NURSE REASSESSMENT NOTE - NS ED NURSE REASSESS COMMENT FT1
Received pt from previous RN A/O times 4 Vs stable placed on cardiac monitor denies chest pain no SOB no N/V no dizziness ambulate steady ,comfort provide lab work  done completed ,pt is seen  evaluate by ED attending  ready to be transport to CT with transporter ,ongoing nursing observation.

## 2019-10-24 NOTE — ED PROVIDER NOTE - PROGRESS NOTE DETAILS
53 yo M with hx of HTN, former smoker presenting with atypical pleuritic chest pain since last night. PERC + for age. Ordered labs, d-dimer, ekg, cxr. Will reassess. TC: 55 yo M with hx of HTN, former smoker presenting with atypical pleuritic chest pain since last night. PERC + for age. Ordered labs, d-dimer, ekg, cxr, ua, urine cx. Will reassess. TC: Labs wnl including negative d-dimer and trop. Ekg without ischemic changes. Cxr negative. Given  mg. Will place in obs for chest pain workup. TC: Labs wnl including negative d-dimer and trop. Ekg without ischemic changes. Cxr negative. Given  mg. HEART score 2 (+risk factors, +age). Will place in obs for chest pain workup. Ua, urine cx sent - will f/u in obs.

## 2019-10-24 NOTE — ED PROVIDER NOTE - ATTENDING CONTRIBUTION TO CARE
53 yo M ex smoker, htn, now with midsternal cp, cramping. no assoc n/v/diaph. non exertional. no cough/fever. no prev w/u. pmd - clinic  vss, nontoxic, well appearing, pink conj, anicteric, MMM, neck supple, CTAB, RRR, equal radial pulses bilat, abd soft/nt/nd, no cva tend. no calves tend, no edema, no fnd. no rashes.  a/p: labs, imaging, reassess

## 2019-10-24 NOTE — ED CDU PROVIDER INITIAL DAY NOTE - PROGRESS NOTE DETAILS
Spoke with Dr Verdugo, there is dense calcium in the LAD, RCA and circumflex, concerned that study may be nondiagnostic; given multiple vessel involvement, consulted cardiology, Dr Campoverde to see pt.

## 2019-10-24 NOTE — CONSULT NOTE ADULT - SUBJECTIVE AND OBJECTIVE BOX
Patient is a 54y old  Male who presents with a chief complaint of   HPI:  Pt is a 54Y M with a PMh of HTN and diarrhea, presenting to the ED for x1day of chest pain. Pt reports that CP started lat night around 9 pm while sitting down watching TV. Pt reports pain started suddenly without reason. Pt describes intermittent  pressure like 10/10 chest pain, radiating to L shoulder, aggravated by laying down, alleviated for standing up. Pt reports no previous such episodes. Pt also reports being SOB during episodes, no previous hx of sob according to pt. pt reports coughing due to sickness with a cold.     Pt is of men descent, working in a [employer], travels to [facility] for 4-6months per year.     EKG shows normal sinus rhythm  Troponin <.01  BNP 96  CT Heart Calcium score 282 putting him at 99th percentile for his age      ROS:  HEENT: denies vision changes, HA  CV/R: per HPI  ABD: Hx of diarrhea, longstanding and using immodium.   : difficulty urinating, following out-pt  MSK/Skin: no edema of extremities    PAST MEDICAL & SURGICAL HISTORY  HTN (hypertension)  H/O hernia repair      FAMILY HISTORY:  FAMILY HISTORY:      SOCIAL HISTORY:  [+]smoker: 2PPD [id number], quit 4mo ago  [-]Alcohol  [-]Drug    ALLERGIES:  No Known Allergies      MEDICATIONS:  MEDICATIONS  (STANDING):  aspirin 325 milliGRAM(s) Oral Once  metoprolol tartrate 50 milliGRAM(s) Oral once    MEDICATIONS  (PRN):      HOME MEDICATIONS:  Home Medications:  Amlodipine 5mg PO QD  Lisinopril 10mg PO QD    VITALS:   T(F): 97.7 (10-24 @ 08:20), Max: 97.7 (10-24 @ 08:20)  HR: 59 (10-24 @ 08:20) (59 - 76)  BP: 160/95 (10-24 @ 08:20) (160/95 - 163/88)  BP(mean): --  RR: 18 (10-24 @ 08:20) (18 - 19)  SpO2: 98% (10-24 @ 05:52) (98% - 98%)    PHYSICAL EXAM:  GEN: Alert and oriented X 3, No acute distress, sitting comfortably in bed  HEENT: no pallor  NECK: Supple, no JVD  LUNGS: Clear to auscultation bilaterally  CARDIOVASCULAR: S1/S2 regular, no murmurs or rubs  ABD: Soft, BS+  EXT: No Lower extremity edema/cyanosis  MSK/SKIN: Intact skin, mild tenderness to palpation of R chest wall.     LABS:                        14.2   2.80  )-----------( 216      ( 24 Oct 2019 07:20 )             42.4     10-24    138  |  99  |  13  ----------------------------<  96  4.5   |  25  |  0.9    Ca    9.4      24 Oct 2019 07:20    TPro  7.2  /  Alb  4.3  /  TBili  0.7  /  DBili  x   /  AST  18  /  ALT  14  /  AlkPhos  51  10-24    PT/INR - ( 24 Oct 2019 07:20 )   PT: 12.50 sec;   INR: 1.09 ratio         PTT - ( 24 Oct 2019 07:20 )  PTT:31.5 sec  Troponin T, Serum: <0.01 ng/mL (10-24-19 @ 07:20)    CARDIAC MARKERS ( 24 Oct 2019 07:20 )  x     / <0.01 ng/mL / x     / x     / x            Troponin trend:    Serum Pro-Brain Natriuretic Peptide: 96 pg/mL (10-24-19 @ 07:20)      RADIOLOGY:  -CXR: None  -CT:   < from: CT Heart Calcium Score (10.24.19 @ 10:17) >  Vessel              Calcium Score    =======================================================                     LAD:                 207                         LCX:                 56                          RCA:                 19                          =======================================================  Total:                282          The left ventricle is mildly enlarged. The ascending aorta is mildly   dilated and measures 4.1 cm. There is no pleural or pericardial effusion.   There is no focal consolidation. Please note that the entire lungs were   not imaged.      Images of the upper abdomen demonstrate no abnormality.    IMPRESSION:    The total Agatston coronary artery calcium score equals 282, which   corresponds to 99th percentile for age, gender and ethnicity.     Mildly dilated ascending aorta measuring 4.1cm.    < end of copied text >    -TTE: None  -CCTA:  -STRESS TEST: None  -CATHETERIZATION: None    ECG: < from: 12 Lead ECG (10.24.19 @ 05:56) >    Diagnosis Line Normal sinus rhythm  Normal ECG    < end of copied text >      TELEMETRY EVENTS: Patient is a 54y old  Male who presents with a chief complaint of   HPI:  Pt is a 54Y M with a PMh of HTN and diarrhea, presenting to the ED for x1day of chest pain. Pt reports that CP started lat night around 9 pm while sitting down watching TV. Pt reports pain started suddenly without reason. Pt describes intermittent  pressure like 10/10 chest pain, radiating to L shoulder, aggravated by laying down, alleviated for standing up. Pt reports no previous such episodes. Pt also reports being SOB during episodes, no previous hx of sob according to pt. pt reports coughing due to sickness with a cold.     Pt is of men descent, working in a [employer], travels to [facility] for 4-6months per year.     EKG shows normal sinus rhythm  Troponin <.01  BNP 96  CT Heart Calcium score 282 putting him at 99th percentile for his age      ROS:  HEENT: denies vision changes, HA  CV/R: per HPI  ABD: Hx of diarrhea, longstanding and using immodium.   : difficulty urinating, following out-pt  MSK/Skin: no edema of extremities    PAST MEDICAL & SURGICAL HISTORY  HTN (hypertension)  H/O hernia repair      FAMILY HISTORY:  FAMILY HISTORY:      SOCIAL HISTORY:  [+]smoker: 2PPD [id number], quit 4mo ago  [-]Alcohol  [-]Drug    ALLERGIES:  No Known Allergies      MEDICATIONS:  MEDICATIONS  (STANDING):  aspirin 325 milliGRAM(s) Oral Once  metoprolol tartrate 50 milliGRAM(s) Oral once    MEDICATIONS  (PRN):      HOME MEDICATIONS:  Home Medications:  Amlodipine 5mg PO QD  Lisinopril 10mg PO QD    VITALS:   T(F): 97.7 (10-24 @ 08:20), Max: 97.7 (10-24 @ 08:20)  HR: 59 (10-24 @ 08:20) (59 - 76)  BP: 160/95 (10-24 @ 08:20) (160/95 - 163/88)  BP(mean): --  RR: 18 (10-24 @ 08:20) (18 - 19)  SpO2: 98% (10-24 @ 05:52) (98% - 98%)    PHYSICAL EXAM:  GEN: Alert and oriented X 3, No acute distress, sitting comfortably in bed  HEENT: no pallor  NECK: Supple, no JVD  LUNGS: Clear to auscultation bilaterally  CARDIOVASCULAR: S1/S2 regular, no murmurs or rubs  ABD: Soft, BS+  EXT/MS: No Lower extremity edema/cyanosis  SKIN: Intact skin, mild tenderness to palpation of R chest wall.     LABS:                        14.2   2.80  )-----------( 216      ( 24 Oct 2019 07:20 )             42.4     10-24    138  |  99  |  13  ----------------------------<  96  4.5   |  25  |  0.9    Ca    9.4      24 Oct 2019 07:20    TPro  7.2  /  Alb  4.3  /  TBili  0.7  /  DBili  x   /  AST  18  /  ALT  14  /  AlkPhos  51  10-24    PT/INR - ( 24 Oct 2019 07:20 )   PT: 12.50 sec;   INR: 1.09 ratio         PTT - ( 24 Oct 2019 07:20 )  PTT:31.5 sec  Troponin T, Serum: <0.01 ng/mL (10-24-19 @ 07:20)    CARDIAC MARKERS ( 24 Oct 2019 07:20 )  x     / <0.01 ng/mL / x     / x     / x            Troponin trend:    Serum Pro-Brain Natriuretic Peptide: 96 pg/mL (10-24-19 @ 07:20)      RADIOLOGY:  -CXR: None  -CT:   < from: CT Heart Calcium Score (10.24.19 @ 10:17) >  Vessel              Calcium Score    =======================================================                     LAD:                 207                         LCX:                 56                          RCA:                 19                          =======================================================  Total:                282          The left ventricle is mildly enlarged. The ascending aorta is mildly   dilated and measures 4.1 cm. There is no pleural or pericardial effusion.   There is no focal consolidation. Please note that the entire lungs were   not imaged.      Images of the upper abdomen demonstrate no abnormality.    IMPRESSION:    The total Agatston coronary artery calcium score equals 282, which   corresponds to 99th percentile for age, gender and ethnicity.     Mildly dilated ascending aorta measuring 4.1cm.    < end of copied text >    -TTE: None  -CCTA:  -STRESS TEST: None  -CATHETERIZATION: None    ECG: < from: 12 Lead ECG (10.24.19 @ 05:56) >    Diagnosis Line Normal sinus rhythm  Normal ECG    < end of copied text >      TELEMETRY EVENTS:

## 2019-10-24 NOTE — ED CDU PROVIDER DISPOSITION NOTE - PATIENT PORTAL LINK FT
You can access the FollowMyHealth Patient Portal offered by [facility] by registering at the following website: [email or web address]. By joining [facility]’s FollowMyHealth portal, you will also be able to view your health information using other applications (apps) compatible with our system.

## 2019-10-24 NOTE — ED CDU PROVIDER DISPOSITION NOTE - CARE PROVIDER_API CALL
Kervin Saleh)  Cardiovascular Disease; Internal Medicine; Interventional Cardiology; Nuclear Cardiology  [address] [place] [address]  Phone: [phone]  Fax: [phone]  Follow Up Time:

## 2019-10-24 NOTE — ED PROVIDER NOTE - OBJECTIVE STATEMENT
55 yo M with PMHx of HTN who presents with gradual onset of intermittent, moderate, midsternal chest "cramping" radiating to back and LUE which started at rest yesterday. Associated with palpitations, sob, bilateral calf pain. Worse with deep breaths/leaning backward, improved with sitting forward/exertion. No fever, cough, diaphoresis, nausea, orthopnea, leg swelling, hx of clots, recent immobilization. Had similar sx 1 yr ago, no prior hx of MI/CAD or cardiac testing. Quit smoking 4 mo ago.

## 2019-10-24 NOTE — ED ADULT NURSE REASSESSMENT NOTE - NS ED NURSE REASSESS COMMENT FT1
Pt reassessed A.O times 4 Vs stable  back from stress test placed on cardiac monitor ,denies chest pain no SOB no N/V no dizziness ambulate steady , NAD noted waiting for the result on going nursing observation .

## 2019-10-24 NOTE — ED PROVIDER NOTE - PHYSICAL EXAMINATION
CONSTITUTIONAL: well developed, nontoxic appearing, in no acute distress, speaking in full sentences  SKIN: warm, dry, no rash, cap refill < 2 seconds  HEENT: normocephalic, atraumatic, no conjunctival erythema, moist mucous membranes, patent airway  NECK: supple, no masses  CV:  regular rate, regular rhythm, 2+ DP pulses bilaterally  RESP: no wheezes, no rales, no rhonchi, normal work of breathing  ABD: soft, nontender, nondistended, no rebound, no guarding  MSK: normal ROM, no cyanosis, no edema, mild bilat calf tenderness  NEURO: alert, oriented, grossly unremarkable  PSYCH: cooperative, appropriate

## 2019-10-24 NOTE — ED CDU PROVIDER DISPOSITION NOTE - NSFOLLOWUPINSTRUCTIONS_ED_ALL_ED_FT
follow up with Dr. Saleh in office tomorrow :  [place] [address]  phone number [phone]    if symptoms become worse please return to the hospital immediately.    Chest Pain    Chest pain can be caused by many different conditions which may or may not be dangerous. Causes include heartburn, lung infections, heart attack, blood clot in lungs, skin infections, strain or damage to muscle, cartilage, or bones, etc. In addition to a history and physical examination, an electrocardiogram (ECG) or other lab tests may have been performed to determine the cause of your chest pain. Follow up with your primary care provider or with a cardiologist as instructed.     SEEK IMMEDIATE MEDICAL CARE IF YOU HAVE ANY OF THE FOLLOWING SYMPTOMS: worsening chest pain, coughing up blood, unexplained back/neck/jaw pain, severe abdominal pain, dizziness or lightheadedness, fainting, shortness of breath, sweaty or clammy skin, vomiting, or racing heart beat. These symptoms may represent a serious problem that is an emergency. Do not wait to see if the symptoms will go away. Get medical help right away. Call 911 and do not drive yourself to the hospital.

## 2019-10-24 NOTE — CONSULT NOTE ADULT - ASSESSMENT
54Y M w/ 40pack year hx, PMH of HTN presented to the ED for atypical chest pain, found to have a normal EKG as well as calcium score of 282 putting him at 99% for age. cardiology consulted for CP.     Chest pain  - Atypical chest pain  - EKG normal sinus rhythm  - Troponin neg x1  - CT Heart calcium sore 282- 99%  - Consider stress test  - c/w home medication regiment 54Y M w/ 40pack year hx, PMH of HTN presented to the ED for atypical chest pain, found to have a normal EKG as well as calcium score of 282 putting him at 99% for age. cardiology consulted for CP.     Chest pain  - Atypical chest pain  - EKG normal sinus rhythm  - Troponin neg x1  - CT Heart calcium sore 282- 99%  - f/u stress test    HTN  - BP remains elevated  - c/w home medication regiment 54Y M w/ 40pack year hx, PMH of HTN presented to the ED for atypical chest pain, found to have a normal EKG as well as calcium score of 282 putting him at 99% for age. cardiology consulted for CP.     Chest pain  - Atypical chest pain  - EKG normal sinus rhythm  - Troponin neg x1  - CT Heart calcium sore 282- 99%  - f/u stress test  - Start asprin and stain    HTN  - BP remains elevated  - c/w home medication regiment

## 2019-10-24 NOTE — ED CDU PROVIDER INITIAL DAY NOTE - OBJECTIVE STATEMENT
pt is a 54y male with pmhx of HTN comes to the ed for midsternal chest pain cramping/throbbing moderate intensity. different from other times he's had CP, pt also c/o palpitations and SOB. pt pain is intermittent and worse with laying down. pt Is former smoker quit 4 months ago. pt denies nausea, vomiting, fever, abdominal pain, back pain loc. pt never had cardiac work up.

## 2019-10-24 NOTE — ED CDU PROVIDER DISPOSITION NOTE - CARE PROVIDERS DIRECT ADDRESSES
[email or web address]

## 2019-10-24 NOTE — ED ADULT NURSE NOTE - CHPI ED NUR SYMPTOMS NEG
no tingling/no nausea/no weakness/no decreased eating/drinking/no chills/no vomiting/no dizziness/no fever

## 2019-10-24 NOTE — ED CDU PROVIDER DISPOSITION NOTE - ATTENDING CONTRIBUTION TO CARE
53 y/o M here for evaluation for CP, pt was placed in Obs. Initially CT heart calcium score was in the 99th percentile, so cardiology was consulted and recommended a stress test. However, the stress test was suboptimal I became involved in the case after the stress test was performed. I explained to pt to stay for stress test to be performed again but pt did not agree to do the test and left. Pt will see Dr. Saleh and was instructed to f/u with him tomorrow in his office.

## 2019-10-24 NOTE — ED CDU PROVIDER INITIAL DAY NOTE - MEDICAL DECISION MAKING DETAILS
53yo man h/o HTN, 40pack year smoking history was placed in CDU for chest pain workup after he c/o sudden onset midsternal, throbbing pain radiating into his L shoulder that began last night and continued intermittently. No SOB, no exertional sx, seems worse when he lies flat. ED workup with labs, EKG, CXR was unremarkable. Plan is for serial EKG/enzymes, CCTA.

## 2019-10-24 NOTE — ED CDU PROVIDER DISPOSITION NOTE - CLINICAL COURSE
plan discussed with patient, advised he should stay for further testing. pt went for ccta wasn't able to get clear image due to high calcium. pt went for nuc stress and was not able to obtain an opitimal test second trop negative. will dVicentec AMA and give patient proper follow up with Dr. mckeon. plan discussed with Dr. Andrade

## 2019-10-24 NOTE — ED PROVIDER NOTE - NS ED ROS FT
GEN:  no fever, no chills, no fatigue  NEURO:  no headache, no dizziness  ENT: no sore throat, no runny nose  CV:  + chest pain, + SOB  RESP:  + dyspnea, no cough  GI:  no nausea, no vomiting, +chronic abdominal pain, no diarrhea, no constipation  :  +chronic dysuria, +chronic urinary frequency  MSK:  no joint pain, no edema  SKIN:  no rash, no bruising  HEME: no hematochezia, no melena

## 2019-10-24 NOTE — ED ADULT NURSE REASSESSMENT NOTE - NS ED NURSE REASSESS COMMENT FT1
Pt reassessed A/O times 4 Vs stable report no pain no SOB no n/V no0 dizziness stress test dine completed pt is seen evaluate by ED attending clear to  go home did eat 100% of dinner , left home did not wait for D /C instruction .

## 2019-10-25 ENCOUNTER — EMERGENCY (EMERGENCY)
Facility: HOSPITAL | Age: 54
LOS: 0 days | Discharge: HOME | End: 2019-10-25
Attending: EMERGENCY MEDICINE | Admitting: EMERGENCY MEDICINE
Payer: MEDICAID

## 2019-10-25 VITALS
OXYGEN SATURATION: 100 % | TEMPERATURE: 98 F | WEIGHT: 160.06 LBS | RESPIRATION RATE: 18 BRPM | SYSTOLIC BLOOD PRESSURE: 141 MMHG | DIASTOLIC BLOOD PRESSURE: 94 MMHG | HEART RATE: 74 BPM

## 2019-10-25 DIAGNOSIS — R07.9 CHEST PAIN, UNSPECIFIED: ICD-10-CM

## 2019-10-25 DIAGNOSIS — Z98.890 OTHER SPECIFIED POSTPROCEDURAL STATES: Chronic | ICD-10-CM

## 2019-10-25 LAB
CULTURE RESULTS: SIGNIFICANT CHANGE UP
SPECIMEN SOURCE: SIGNIFICANT CHANGE UP

## 2019-10-25 PROCEDURE — 78451 HT MUSCLE IMAGE SPECT SING: CPT | Mod: 26

## 2019-10-25 PROCEDURE — 93010 ELECTROCARDIOGRAM REPORT: CPT

## 2019-10-25 NOTE — ASSESSMENT
[FreeTextEntry1] : 54 M w/PMH HTN, dysruia (frequent) with increased frequency with diminished stream, chronic diarrhea, chronic neck and lbp, presents for a new pt comprehensive check up\par \par # HTN- uncontrolled \par - pt will need to bring in BP log on next visit, will re-asess medication dosages\par - Continue with Amlodipine and lisinopril \par \par # Chronic Diarrhea, suspected IBS \par - continue with imodium \par \par # Dysuria, increased frequency, suspected component of BPH\par - Prostate U/S\par - Start tamsulosin\par - Re-check UA \par - urology referral, check PSA \par \par # Neck and lower back pain\par - PT referral \par \par # Former smoker, still \par - johanne need low dose CT for screening at age 55\par - 30 ppd history, quit 3 months ago, is chewing tobacco once/qod\par \par # HCM\par - pt refused flu vaccine today \par - Check lipid profile, hbA1c. CBC and CMP done in the ED recently when pt came for med refill \par - GI referral for colonoscopy

## 2019-10-25 NOTE — ED PROVIDER NOTE - PROGRESS NOTE DETAILS
I took care of pt in CDU yesterday; he signed out AMA though plan was for further imaging vs cath. This morning he returned to the ED; I spoke with Dr Saleh regarding plan for today, but pt stormed out while medical student was talking to him, before I even had a chance to tell him about the plan. I called his cell phone and left a message for him to return or call me directly, at least to discuss followup.

## 2019-10-25 NOTE — REVIEW OF SYSTEMS
[Abdominal Pain] : abdominal pain [Dysuria] : dysuria [Diarrhea] : diarrhea [Nocturia] : nocturia [Frequency] : frequency [Muscle Pain] : muscle pain [Back Pain] : back pain [Headache] : headache [Negative] : Psychiatric [Joint Pain] : no joint pain [Joint Stiffness] : no joint stiffness [Muscle Weakness] : no muscle weakness [Joint Swelling] : no joint swelling [FreeTextEntry7] : ab cramping

## 2019-10-25 NOTE — ED PROVIDER NOTE - CLINICAL SUMMARY MEDICAL DECISION MAKING FREE TEXT BOX
Pt eloped prior to my evaluation. I spoke with consultants prior to speaking with pt to get plan, but pt walked out before I could convey that to him.

## 2019-10-25 NOTE — HEALTH RISK ASSESSMENT
[Good] : ~his/her~  mood as  good [Intercurrent ED visits] : went to ED [] : Yes [No] : No [30 or more] : 30 or more [No falls in past year] : Patient reported no falls in the past year [0] : 1) Little interest or pleasure doing things: Not at all (0) [With Family] : lives with family [Employed] : employed [] :  [Sexually Active] : sexually active [Feels Safe at Home] : Feels safe at home [Fully functional (bathing, dressing, toileting, transferring, walking, feeding)] : Fully functional (bathing, dressing, toileting, transferring, walking, feeding) [Travel to Developing Areas] : travel to developing areas [FreeTextEntry1] : HA, blood pressure, back pain, dysuria, slow stream and inc frequency, diarrhea  [YearQuit] : 2019, pt quit 3 months ago [Change in mental status noted] : No change in mental status noted [Reports changes in hearing] : Reports no changes in hearing [Reports changes in vision] : Reports no changes in vision [Reports changes in dental health] : Reports no changes in dental health [Smoke Detector] : no smoke detector [FreeTextEntry2] : Yann

## 2019-10-25 NOTE — ED ADULT TRIAGE NOTE - CHIEF COMPLAINT QUOTE
Patient states he was seen in ED yesterday but left without being seen. Patient complains of chest pain today.

## 2019-11-15 ENCOUNTER — APPOINTMENT (OUTPATIENT)
Dept: GASTROENTEROLOGY | Facility: CLINIC | Age: 54
End: 2019-11-15

## 2019-11-29 ENCOUNTER — APPOINTMENT (OUTPATIENT)
Dept: INTERNAL MEDICINE | Facility: CLINIC | Age: 54
End: 2019-11-29

## 2019-12-19 ENCOUNTER — APPOINTMENT (OUTPATIENT)
Dept: UROLOGY | Facility: CLINIC | Age: 54
End: 2019-12-19

## 2023-03-17 NOTE — ED PROVIDER NOTE - OBJECTIVE STATEMENT
56 y/o M w/ h/o HTN, presenting requesting refills of his antihypertensives. +mild headache. Per call to Pharmacy ([facility] [phone]), he last filled a rx for amlodipine 5mg and lisinopril 10mg in 2017. He also reports chronic urinary frequency and dysuria. Denies fever, n/v/d. DISPLAY PLAN FREE TEXT DISPLAY PLAN FREE TEXT

## 2023-04-12 NOTE — ED PROVIDER NOTE - CARE PLAN
[facility]  PT bintaal completed 4/12/23 with PT to continue  cardiac rehabilitation address activity tolerance with frequency 1w2; patient has follow up with Dr. Ramirez on 4/20/23 and I would anticipate DC from  services to outpatient cardiac rehab at that time pending continued progress towards goals and homebound status.   Principal Discharge DX:	Urinary hesitancy